# Patient Record
(demographics unavailable — no encounter records)

---

## 2024-11-12 NOTE — REVIEW OF SYSTEMS
[SOB on Exertion] : sob on exertion [Seasonal Allergies] : seasonal allergies [Back Pain] : back pain [History of Iron Deficiency] : history of iron deficiency [Negative] : Endocrine [TextBox_44] : +PPM

## 2024-11-12 NOTE — REASON FOR VISIT
[Follow-Up] : a follow-up visit [Abnormal CXR/ Chest CT] : an abnormal CXR/ chest CT [Asthma] : asthma [Sleep Apnea] : sleep apnea [Shortness of Breath] : shortness of breath [Pulmonary Nodules] : pulmonary nodules [TextBox_44] : weight issues

## 2024-11-12 NOTE — HISTORY OF PRESENT ILLNESS
[Intermittent] : intermittent [Inhaled Short-Acting Beta-2 Agonists] : inhaled short-acting beta-2 agonists [Inhaled Long-Acting Beta-2 Agonists] : inhaled long-acting beta-2 agonists [Inhaled Corticosteroids] : inhaled corticosteroids [CPAP] : CPAP [Good Compliance] : good compliance with treatment [Good Tolerance] : good tolerance of treatment [Good Symptom Control] : good symptom control [Follow-Up - Routine Clinic] : a routine clinic follow-up of [Excess Weight] : excess weight [Low Calorie Diet] : low calorie diet [Exercise Regimen] : exercise regimen [Fair Compliance] : fair compliance with treatment [Fair Tolerance] : fair tolerance of treatment [Fair Symptom Control] : fair symptom control [Sleep Apnea] : sleep apnea [Hypertension] : hypertension [High] : high [Well Balanced Diet] : well balanced meals [___ Times/Week] : exercises [unfilled] times per week [Aerobic Conditioning] : aerobic conditioning [On ___] : performed on [unfilled] [Patient] : the patient [Indication ___] : for an indication of [unfilled] [None] : no new symptoms reported [TextBox_4] : Patient c/o SOBOE but is otherwise without associated respiratory complaints. Pt is an ex-smoker of up to 2 ppd x 5 years but then smoked cigars and pipes. Now he has been off of everything around 2001 Pt reports that he acts out his dreams; concern for REM behavior disorder. He was on Melatonin but now on Klonopin 0.5 mg with imrpovement. [FreeTextEntry1] : \par   [de-identified] : at 13 cm of water [FreeTextEntry9] : Chest CT [FreeTextEntry8] : mild dependent atelectasis  [TextEntry] : Chest CT from 8/1/14 reveals resolution of the previously described 4 mm nodules with small areas of atelectasis.  Chest CT from 10/6/15 revealed a stable 3 mm nodule going back to 10/2013; stable for 2 years.

## 2024-11-12 NOTE — DISCUSSION/SUMMARY
[FreeTextEntry1] : #1. The patient's SOB has essentially resolved with mild residual SOBOE. He has been doing well with Wixela 250 alone for his likely asthma given his obstructive pattern on PFTs and significant bronchodilator response in the past. I added Spiriva previously, but he did not tolerate this medication therefore he discontinued it. His last spirometry was improved on Wixela 250 alone with Albuterol as needed. #2. Pt was seen by psychiatry for nocturnal thrashing and was concerned that Wixela may be contributing so changed to Trelegy 200 but is back on Wixela 250; reviewed inhaler technique and stressed importance of rinsing mouth and throat after inhaler use. #3. Continue CPAP therapy at 13 cm of water for moderate CHASE. He has been in SR since he started CPAP. #4. Pt with possible REM BD based on symptoms though PSG without increased muscle tone or PLMs to explain thrashing at night though does not occur every night; pt may benefit from Melatonin 6-15 mg nightly which he can start at 3 mg initially and titrate as needed. He was on 9 mg and then 12 mg nightly but changed to 3 mg of Melatonin and Klonopin therapy at 0.5 mg nightly and is much better. #4. Replace equipment as needed; ordered 5/28/24 with [facility] per pt's request as he was making noises with his nasal mask. #5. Cardiology f/u to optimize his cardiac function.  #6. F/u chest CT in 10/2015 revealed a stable 3 mm nodule going back to 2013 and no further w/u is required at this time. #7. Diet and exercise for weight loss. #8. F/u in 6 months with compliance and layla. #9. S/p both Covid vaccines and booster; Rec annual flu vaccine.  The patient expressed understanding and agreement with the above recommendations/plan and accepts responsibility to be compliant with recommended testing, therapies, and f/u visits. All relevant questions and concerns were addressed.

## 2024-11-12 NOTE — CONSULT LETTER
[Dear  ___] : Dear  [unfilled], [Consult Letter:] : I had the pleasure of evaluating your patient, [unfilled]. [Please see my note below.] : Please see my note below. [Consult Closing:] : Thank you very much for allowing me to participate in the care of this patient.  If you have any questions, please do not hesitate to contact me. [Sincerely,] : Sincerely, [FreeTextEntry3] : Leobardo Crocker MD, FCCP, D. ABSM\par  Pulmonary and Sleep Medicine\par  [facility] [place]

## 2024-11-12 NOTE — PHYSICAL EXAM
[No Acute Distress] : no acute distress [Low Lying Soft Palate] : low lying soft palate [Enlarged Base of the Tongue] : enlarged base of the tongue [III] : Mallampati Class: III [Normal Appearance] : normal appearance [Supple] : supple [Normal Rate/Rhythm] : normal rate/rhythm [Normal S1, S2] : normal s1, s2 [No Murmurs] : no murmurs [No Resp Distress] : no resp distress [No Acc Muscle Use] : no acc muscle use [Normal Rhythm and Effort] : normal rhythm and effort [Clear to Auscultation Bilaterally] : clear to auscultation bilaterally [No Abnormalities] : no abnormalities [Benign] : benign [Not Tender] : not tender [Soft] : soft [No Clubbing] : no clubbing [1+ Pitting] : 1+ pitting [No Focal Deficits] : no focal deficits [Oriented x3] : oriented x3 [TextBox_11] : Moderate oropharyngeal crowding.

## 2024-11-12 NOTE — END OF VISIT
[Time Spent: ___ minutes] : I have spent [unfilled] minutes of time on the encounter which excludes teaching and separately reported services. [TextEntry] : Discussed with pt at length regarding asthma, CHASE, obesity, pulm nodule, possible REM BD; reviewed prior w/u with pt as above

## 2024-11-12 NOTE — RESULTS/DATA
[TextEntry] : PSG from 4/5/13 reveals severe CHASE with an AHI of 32. CPAP titration study from 4/10/13 revealed an optimum pressure of 12 cm of water.  S/N PSG from 7/29/15 revealed moderate CHASE with an AHI of 20 and a therapeutic pressure of 13 cm of water. PSG on PAP therapy revealed mild CHASE with an AHI of 6.8 without significant increase in muscle tone or significant PLMs. Compliance is % and a > 4 hr compliance of 77-97% with a residual AHI of 0.1-1.5 which is normal on 13 cm of water.

## 2024-11-12 NOTE — PROCEDURE
[FreeTextEntry1] : PFTs 6/19/17 - mild obstructive pattern without a significant BD response but with normal lung volumes and DLCO. His lung function was essentially at baseline. PFTs 6/7/18 - Mildly reduced FVC and FEV1 with an obstructive pattern but no significant BD response. Lung volumes and diffusion capacity were normal Spirometry 6/15/18 and 10/8/18 - Mild obstruction and back to baseline. PFTs 8/6/19 - Near normal with a minimally reduced FEV1 but otherwise normal PFTs and persistent obstruction. PFTs 4/26/21 - Normal FVC and mildly reduced FEV1 with obstruction with normal lung volumes and DLCO; overall slightly reduced c/w previous. PFTs 4/19/22 - Normal FVC and mildly reduced FEV1 with obstruction pattern with normal lung volumes and DLCO; essentially unchanged from previous. PFTs 8/22/23 - Normal FVC and mildly reduced FEV1 with obstruction with normal lung volumes and DLCO; essentially unchanged from previous. PFTs from 5/28/24 with normal layla but with obstruction and near normal lung volumes and DLCO. Near baseline.

## 2024-11-12 NOTE — HISTORY REVIEWED
[History reviewed] : History reviewed. [Medications and Allergies reviewed] : Medications and allergies reviewed. Drysol Counseling:  I discussed with the patient the risks of drysol/aluminum chloride including but not limited to skin rash, itching, irritation, burning.

## 2024-11-13 NOTE — PHYSICAL EXAM
[JVD] : no jugular venous distention  [Carotid Bruits] : no carotid bruits [Abdominal Masses] : No abdominal masses [Abdomen Tenderness] : ~T ~M No abdominal tenderness [Purpura] : no purpura  [Petechiae] : no petechiae [Skin Ulcer] : no ulcer [Skin Induration] : no induration [Alert] : alert [Oriented to Person] : oriented to person [Oriented to Place] : oriented to place [Oriented to Time] : oriented to time [Calm] : calm [de-identified] : non toxic, in no acute distress  [de-identified] : NC/AT  PERRL EOMI no scleral icterus  [de-identified] : trachea midline, no gross mass  [de-identified] : no audible wheezing or stridor  [de-identified] : soft, remains mildly obese, mild right lower abdominal tenderness, no guarding, no rebound, no masses  [de-identified] : FROM of all extremities with no gross deformity or angulation, there remains a non reducible ventral hernia about 3 cm above the umbilicus with a small non tender umbilical hernia  [de-identified] : mood is calm

## 2024-11-13 NOTE — ASSESSMENT
[FreeTextEntry1] : The patient is an 80-year-old male with stable ventral and umbilical hernias.  The patient now has onset of right lower abdominal pain. He had an area of chronic enteritis on prior CT scan imaging. He has been advised he will need another CT scan to rule out worsening of the enteritis in the right lower abdominal area. He was advised that the pain is not related to the hernias as these are stable, it is more likely related to the chronic enteritis seen on his prior imaging.  He has been advised that should the pain get worse, fevers, and vomit he should go to the ER for evaluation.  Further recommendations will follow review of the CT scan.  A total of 30 minutes was spent coordinating the patient's care.

## 2024-11-13 NOTE — HISTORY OF PRESENT ILLNESS
[de-identified] : The patient is with new onset right mid and right lower abdominal pain. He has no nausea or vomit, no fevers. He states that the pain started about 2 days ago and he feels it is the hernias getting worse.

## 2024-12-09 NOTE — HISTORY OF PRESENT ILLNESS
[de-identified] : The patient states that the right lower abdominal pain has improved and is only sensitive intermittently. He has no nausea or vomit and changes in his bowel function.

## 2024-12-09 NOTE — PHYSICAL EXAM
[JVD] : no jugular venous distention  [Carotid Bruits] : no carotid bruits [Abdominal Masses] : No abdominal masses [Abdomen Tenderness] : ~T ~M No abdominal tenderness [Purpura] : no purpura  [Petechiae] : no petechiae [Skin Ulcer] : no ulcer [Skin Induration] : no induration [Alert] : alert [Oriented to Person] : oriented to person [Oriented to Place] : oriented to place [Oriented to Time] : oriented to time [Calm] : calm [de-identified] : non toxic, in no acute distress  [de-identified] : NC/AT  PERRL EOMI no scleral icterus  [de-identified] : trachea midline, no gross mass  [de-identified] : no audible wheezing or stridor  [de-identified] : soft, remains mildly obese, no significant right lower abdominal tenderness, no guarding, no rebound, no masses  [de-identified] : FROM of all extremities with no gross deformity or angulation, there remains a non reducible ventral hernia about 3 cm above the umbilicus with a small non tender umbilical hernia  [de-identified] : mood is calm

## 2024-12-09 NOTE — ASSESSMENT
[FreeTextEntry1] : The patient is an 80-year-old male with intermittent right lower abdominal pain and thickening of the terminal ileum seen on 2 CT scans concerning for possible Crohn's.  The patient has been advised that he will benefit from GI follow up and will likely need a colonoscopy.  Regarding the hernias, we will continue with conservative management at this time. A total of 20 minutes was spent coordinating the patient's care.

## 2025-01-03 NOTE — PHYSICAL EXAM

## 2025-01-03 NOTE — HISTORY OF PRESENT ILLNESS
[FreeTextEntry1] : The patient has arrived for a consultation visit.  He has been evaluated in the past for pancreatic cystic lesion which has been stable.  Lately he has been complaining of a lot of abdominal cramping and discomfort and was noted to have terminal ileitis.  Then when I reviewed his scans it has been present for some time.  He had a colonoscopy with Dr. Maldonado which has shown some diverticulosis but his terminal ileum could not be intubated.  But this has been present on multiple scans.  He necessarily does not have any diarrhea or any rectal bleeding.  He is not on any medication for Crohn's disease.  He has a pacemaker.

## 2025-01-03 NOTE — ASSESSMENT
[FreeTextEntry1] : After reviewing all his imaging, I am recommending to obtain the labs including fecal calprotectin level.  We will consider a colonoscopy.  I will also consider doing a CT enterography to evaluate the length of the stricture.  If I cannot cannulate the terminal ileum, may need to refer him for surgical resection versus primary therapy.  Also due to his advanced age, consideration of biological therapy can be an issue.  Whether he has fibrostenotic or inflammatory disease, may have to be determined.  Had extensive counseling and discussion and management planning.  Sarbjit Johnson MD Gastroenterology

## 2025-01-27 NOTE — PHYSICAL EXAM

## 2025-01-27 NOTE — HISTORY OF PRESENT ILLNESS
[FreeTextEntry1] : Mr. Raines is an 80-year-old gentleman who was referred for colonoscopy evaluation of terminal ileitis, inability to access terminal ileum on prior colonoscopy.

## 2025-01-27 NOTE — ASSESSMENT
[FreeTextEntry1] : Mr. Raines is an 80-year-old gentleman who was referred for colonoscopy evaluation of terminal ileitis, inability to access terminal ileum on prior colonoscopy. Risks, benefits and alternatives discussed in detail. Patient medically optimized and agreeable to proceed with planned procedure.

## 2025-04-15 NOTE — PHYSICAL EXAM
[Alert] : alert [Normal Voice/Communication] : normal voice/communication [Healthy Appearing] : healthy appearing [No Acute Distress] : no acute distress [Sclera] : the sclera and conjunctiva were normal [Hearing Threshold Finger Rub Not [place]] : hearing was normal [Oropharynx] : the oropharynx was normal [Normal Appearance] : the appearance of the neck was normal [No Respiratory Distress] : no respiratory distress [No Acc Muscle Use] : no accessory muscle use [Respiration, Rhythm And Depth] : normal respiratory rhythm and effort [Heart Rate And Rhythm] : heart rate was normal and rhythm regular [Abdomen Tenderness] : non-tender [No Masses] : no abdominal mass palpated [Abdomen Soft] : soft [] : no hepatosplenomegaly [Oriented To Time, Place, And Person] : oriented to person, place, and time

## 2025-04-15 NOTE — ASSESSMENT
[FreeTextEntry1] : Mr. Raines is an 81-year-old gentleman who was referred for colonoscopy evaluation of terminal ileitis, inability to access terminal ileum on prior colonoscopy due to poor bowel preparation. Risks, benefits and alternatives discussed in detail. Patient medically optimized and agreeable to proceed with planned procedure.

## 2025-04-15 NOTE — HISTORY OF PRESENT ILLNESS
[FreeTextEntry1] : Mr. Raines is an 81-year-old gentleman who was referred for colonoscopy evaluation of terminal ileitis, inability to access terminal ileum on prior colonoscopy due to poor bowel preparation.

## 2025-05-12 NOTE — HISTORY OF PRESENT ILLNESS
[Intermittent] : intermittent [Inhaled Short-Acting Beta-2 Agonists] : inhaled short-acting beta-2 agonists [Inhaled Long-Acting Beta-2 Agonists] : inhaled long-acting beta-2 agonists [Inhaled Corticosteroids] : inhaled corticosteroids [CPAP] : CPAP [Good Compliance] : good compliance with treatment [Good Tolerance] : good tolerance of treatment [Good Symptom Control] : good symptom control [Follow-Up - Routine Clinic] : a routine clinic follow-up of [Excess Weight] : excess weight [Low Calorie Diet] : low calorie diet [Exercise Regimen] : exercise regimen [Fair Compliance] : fair compliance with treatment [Fair Tolerance] : fair tolerance of treatment [Fair Symptom Control] : fair symptom control [Sleep Apnea] : sleep apnea [Hypertension] : hypertension [High] : high [Well Balanced Diet] : well balanced meals [___ Times/Week] : exercises [unfilled] times per week [Aerobic Conditioning] : aerobic conditioning [On ___] : performed on [unfilled] [Patient] : the patient [Indication ___] : for an indication of [unfilled] [None] : no new symptoms reported [TextBox_4] : Patient c/o SOBOE but is otherwise without associated respiratory complaints. Pt is an ex-smoker of up to 2 ppd x 5 years but then smoked cigars and pipes. Now he has been off of everything around 2001 Pt reports that he acts out his dreams; concern for REM behavior disorder. He was on Melatonin but now on Klonopin 0.5 mg with imrpovement. [FreeTextEntry1] : \par   [de-identified] : at 13 cm of water [FreeTextEntry9] : Chest CT [FreeTextEntry8] : mild dependent atelectasis  [TextEntry] : Chest CT from 8/1/14 reveals resolution of the previously described 4 mm nodules with small areas of atelectasis.  Chest CT from 10/6/15 revealed a stable 3 mm nodule going back to 10/2013; stable for 2 years.

## 2025-05-12 NOTE — PROCEDURE
[FreeTextEntry1] : PFTs 6/19/17 - mild obstructive pattern without a significant BD response but with normal lung volumes and DLCO. His lung function was essentially at baseline. PFTs 6/7/18 - Mildly reduced FVC and FEV1 with an obstructive pattern but no significant BD response. Lung volumes and diffusion capacity were normal Spirometry 6/15/18 and 10/8/18 - Mild obstruction and back to baseline. PFTs 8/6/19 - Near normal with a minimally reduced FEV1 but otherwise normal PFTs and persistent obstruction. PFTs 4/26/21 - Normal FVC and mildly reduced FEV1 with obstruction with normal lung volumes and DLCO; overall slightly reduced c/w previous. PFTs 4/19/22 - Normal FVC and mildly reduced FEV1 with obstruction pattern with normal lung volumes and DLCO; essentially unchanged from previous. PFTs 8/22/23 - Normal FVC and mildly reduced FEV1 with obstruction with normal lung volumes and DLCO; essentially unchanged from previous. PFTs from 5/28/24 with normal layla but with obstruction and near normal lung volumes and DLCO. Near baseline. Layla 5/12/25 - Normal FVC and mildly reduced FEV1 with obstruction; slightly reduced from prior.

## 2025-05-12 NOTE — CONSULT LETTER
[Dear  ___] : Dear  [unfilled], [Consult Letter:] : I had the pleasure of evaluating your patient, [unfilled]. [Please see my note below.] : Please see my note below. [Consult Closing:] : Thank you very much for allowing me to participate in the care of this patient.  If you have any questions, please do not hesitate to contact me. [Sincerely,] : Sincerely, [FreeTextEntry3] : Leobardo Crocker MD, FCCP, D. ABSM\par  Pulmonary and Sleep Medicine\par  [facility] at [place]

## 2025-05-12 NOTE — DISCUSSION/SUMMARY
[FreeTextEntry1] : #1. The patient's SOB has essentially resolved with mild residual SOBOE. He has been doing well with Wixela 250 alone for his likely asthma given his obstructive pattern on PFTs and significant bronchodilator response in the past. I added Spiriva previously, but he did not tolerate this medication therefore he discontinued it. His last spirometry was improved on Wixela 250 alone with Albuterol as needed. #2. Pt was seen by psychiatry for nocturnal thrashing and was concerned that Wixela may be contributing so changed to Trelegy 200 but is back on Wixela 250; reviewed inhaler technique and stressed importance of rinsing mouth and throat after inhaler use. #3. Continue CPAP therapy at 13 cm of water for moderate CHASE. He has been in SR since he started CPAP. #4. Pt with possible REM BD based on symptoms though PSG without increased muscle tone or PLMs to explain thrashing at night though does not occur every night; pt may benefit from Melatonin 6-15 mg nightly which he can start at 3 mg initially and titrate as needed. He was on 9 mg and then 12 mg nightly but now changed to 3 mg of Melatonin and Klonopin therapy at 0.5 mg nightly and is much better. #4. Replace equipment as needed; ordered 5/12/25 with [facility] per pt's request as he was making noises with his nasal mask. #5. Cardiology f/u to optimize his cardiac function.  #6. F/u chest CT in 10/2015 revealed a stable 3 mm nodule going back to 2013 and no further w/u is required at this time. #7. Diet and exercise for weight loss. #8. F/u in 4 months with compliance and layla given slight reduction in layla currently. #9. S/p both Covid vaccines and booster; Rec annual flu vaccine.  The patient expressed understanding and agreement with the above recommendations/plan and accepts responsibility to be compliant with recommended testing, therapies, and f/u visits. All relevant questions and concerns were addressed.

## 2025-05-22 NOTE — HISTORY OF PRESENT ILLNESS
[de-identified] : Mr. 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 Referred by Dr. Sarbjit Johnson (GI).   Initially evaluated by Dr. Geronimo Maldonado (GI) in June 2020 for episodic rectal bleeding x 2 months and an episode of epigastric pain accompanied by vomiting 7-8x. Patient had been consuming about 6 alcoholic drinks per night. Then referred to Dr. Johnson for an EGD/EUS - completed on 9/3/2020. He was noted to have pancreatic neck cystic lesion (16 x 10 mm), and a pancreatic body lesion (approx 24 mm x15 mm). Fluid CEA level was high amylase level was low. Suggestive of mucinous cystic lesion. Some atypical findings on the pancreatic body cystic lesion. It was statistically high-risk on the molecular analysis-[employer] [facility].   Subsequently referred to surgical oncology and has been undergoing annual surveillance via CT A/P and CEA/Ca 19-9. Surveillance imaging from 05/14/2025 was stable and also demonstrated some short segment wall thickening involving the distal ileum, concerning for Chron's or chronic enteritis.   Mr. Raines returns today for follow up. Denies jaundice, acute pancreatitis, itching, chills, vomiting, steatorrhea, or other pancreatic sx. He does complain of recent persistent bloating, passing gas, and general abdominal discomfort. Recently followed up with GI, Dr. Gibbs, in April for a colonoscopy. Reportedly found with diverticulosis and possible Chron's. No follow up scheduled with GI yet.   PMH: HTN, HLD, A-fib (on chronic anticoagulation), HLD; HTN; obesity; mild COPD, asthma, Prostate CA 2017, hemorrhoids, hernia; CHASE; diverticulosis PSH: Permanent pacemaker, 3 episodes of cardioversion, inguinal hernia repair, abdominal hernia repair, tonsillectomy Social hx: Social etoh use; former smoker; ; retired Family Hx: Mother - DM2 Allergies: PCN

## 2025-05-22 NOTE — RESULTS/DATA
[FreeTextEntry1] : *** 05/14/2025 - CT ABDOMEN AND PELVIS IC ***   INTERPRETATION:  CLINICAL INFORMATION: 81-year-old male with pancreatic cysts for continued surveillance.  COMPARISON: Abdomen and pelvis CT scan dated 12/20/2024, 11/21/2024.  CONTRAST/COMPLICATIONS: IV Contrast: Isovue 370  90 cc administered   10 cc discarded Oral Contrast: NONE .  PROCEDURE: CT of the Abdomen and Pelvis was performed. Arterial imaging of the abdomen and Portal Venous phase imaging of the abdomen and pelvis were acquired. Sagittal and coronal reformats were performed.  FINDINGS: LOWER CHEST: Atrial and ventricular pacemaker leads, partially imaged.  LIVER: Within normal limits. BILE DUCTS: Normal caliber. GALLBLADDER: Within normal limits. SPLEEN: Within normal limits. PANCREAS: As on the prior study, there are cystic lesions seen within the pancreatic body, neck, and uncinate process. A dominant pancreatic body cystic lesion measures approximately 1.7 x 1.1 cm (8:39), unchanged. Two adjacent pancreatic body lesions, the larger of which measures 1.3 cm and a cystic lesion in the uncinate process measuring approximately 1.0 cm are also unchanged (8:43). There is no dilatation of the main pancreatic duct. ADRENALS: Within normal limits. KIDNEYS/URETERS: The kidneys enhance symmetrically without evidence for hydronephrosis. There are bilateral renal cysts including parapelvic cysts, a small hyperdense cyst in the anterior left upper pole, and subcentimeter hypodense renal lesions that are too small to characterize.  BLADDER: The urinary bladder is underdistended which limits evaluation. REPRODUCTIVE ORGANS: The prostate gland is within normal limits and demonstrates coarse calcifications.  BOWEL: No bowel obstruction. Scattered colonic diverticulosis is seen without evidence for diverticulitis. The appendix is not visualized.  There is circumferential short segment wall thickening involving the distal ileum (8:72 through 108) without substantial luminal dilatation. There are stranding changes again noted within the surrounding small bowel mesentery along with small lymph nodes which are without significant interval change.  PERITONEUM/RETROPERITONEUM: Within normal limits. VESSELS: The abdominal aorta is normal in caliber and demonstrates vascular calcifications. The celiac trunk, superior mesenteric artery, bilateral renal arteries, and inferior mesenteric artery are patent and normal in caliber. LYMPH NODES: No lymphadenopathy. ABDOMINAL WALL: Within normal limits. BONES: Status post right ORIF. Degenerative changes.  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 No dilatation of the main pancreatic duct.  Circumferential short segment wall thickening involving the distal ileum again appreciated with surrounding mesenteric stranding and small lymph nodes and without substantial luminal dilatation. The finding is not significantly changed compared with prior studies dating back to at least November 2024. This is thought most likely to represent chronic enteritis, possibly Crohn's disease, however, clinical correlation is recommended. Additional evaluation could be obtained with CT or MR enterography (if no contraindication to MRI).    --- End of Report ---

## 2025-05-22 NOTE — REASON FOR VISIT
[Follow-Up Visit] : a follow-up visit for [Referred By: ___] : Referred By: [unfilled] [FreeTextEntry2] : mucinous pancreatic cystic lesion

## 2025-05-22 NOTE — CONSULT LETTER
[Dear  ___] : Dear  [unfilled], [Courtesy Letter:] : I had the pleasure of seeing your patient, [unfilled], in my office today. [Please see my note below.] : Please see my note below. [Consult Closing:] : Thank you very much for allowing me to participate in the care of this patient.  If you have any questions, please do not hesitate to contact me. [Sincerely,] : Sincerely, [DrMedhat  ___] : Dr. ROY [DrMedhat ___] : Dr. ROY [FreeTextEntry3] : Roberto La MD, MPH, FACS, FSSO\par  , [facility] Surgical Oncology [facility]\par  [facility] [place]\par  Associate Professor of Surgery\par  [employer] at [facility]

## 2025-05-22 NOTE — PHYSICAL EXAM
[Normal] : not distended, non tender, no masses, no hepatosplenomegaly [de-identified] : wears glasses [de-identified] : RRR [de-identified] : easy WOB

## 2025-05-22 NOTE — ASSESSMENT
[FreeTextEntry1] : Mr. 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 Referred by Dr. Sarbjit Johnson (GI). Transitioning care from Dr. La, who is no longer with the hospital system.   Clinically, the patient's functional status is unremarkable/stable. Reviewed the imaging from 05/14/2025, CT A/P, which was overall stable. Noted on imaging was circumferential short segment wall thickening involving the distal ileum, stable compared to prior imaging, but concerning for chronic enteritis or possibly Crohn's disease. Advised he follow up with Dr. Gibbs regarding his symptoms and imaging findings, for further work up of Crohn's. I have messaged Dr. Gibbs's team in support of the patient's care.   Plan at this time is to continue annual surveillance imaging of the pancreatic cyst, given that the risk for developing a pancreatic CA is elevated in patients with pancreatic cysts in comparison to the general population. An abdominal/pelvic CT script was provided to the patient for May 2026. Per the patient's request, I will also forward a copy of the patient's note from today's encounter to his PCP, Dr. Gely Luna. Should the patient develop DM, jaundice, or acute pancreatitis, I have advised the patient to contact the office. All questions and concerns were addressed. Patient vocalized understanding and agreement to assessment and treatment plan.   PLAN: 1) Annual CT A/P in May '26 2) Follow up in 1 year, sooner if needed 3) Send CT report to PCP, Dr. Gely Luna 4) Follow up with DEMETRIUS Diaz MD   Assistant Professor of Surgery [place] [place] [employer] [facility] [employer] at [facility] Division of Surgical Oncology [facility] Phone: [phone] Fax: [phone]  Today, I personally spent 35 minutes in total time including reviewing imaging and studies, discussing complex treatment regimens, direct face to face time with the patient, patient education, answering patient questions and counseling, excluding separately billable procedures and billing time.   This note was written by Paula Ortiz on 05/22/2025, acting solely as a scribe for Dr. Miller Diaz MD. I have documented the information dictated during the patient encounter for the following sections: RFV, HPI, ROS, PE, ASSESSMENT/PLAN.  I personally performed the services described in the documentation, reviewed the documentation recorded by the scribe in my presence, and it accurately and completely records my words and actions.

## 2025-06-24 NOTE — PHYSICAL EXAM
[Alert] : alert [Normal Voice/Communication] : normal voice/communication [Healthy Appearing] : healthy appearing [No Acute Distress] : no acute distress [Sclera] : the sclera and conjunctiva were normal [Hearing Threshold Finger Rub Not [place]] : hearing was normal [Normal Lips/Gums] : the lips and gums were normal [Normal Appearance] : the appearance of the neck was normal [No Respiratory Distress] : no respiratory distress [No Acc Muscle Use] : no accessory muscle use [Abdomen Tenderness] : non-tender [No Masses] : no abdominal mass palpated [Abdomen Soft] : soft [] : no hepatosplenomegaly [Oriented To Time, Place, And Person] : oriented to person, place, and time

## 2025-06-24 NOTE — HISTORY OF PRESENT ILLNESS
[FreeTextEntry1] : January 2020 with Dr. Gibbs showed hemorrhoids, reticulosis, erythema in the ascending/cecum with superficial bleeding with contact needing 1 hemostatic clip placed..  Unable to intubate TI due to anatomical and visualization limitations.   April 2025 showed abnormal TI, loss of villi, possibly scarring, 3 mm adenomatous polyp in the ascending colon, hemorrhoids and diverticulosis.  Pathology of the TI biopsy was negative for colitis.

## 2025-06-24 NOTE — ASSESSMENT
[FreeTextEntry1] : Patient is a 81 year old male, with PMH of mucinous pancreatic lesion, HTN, HLD, A-fib on Eliquis, obesity, mild COPD, asthma, prostate cancer in 2017, CHASE, diverticulosis, PPM, who presents for follow-up visit.  Terminal ileitis on CT a/p since November 2024, pt with predominantly constipation taking Miralax daily TI abnormal on colonoscopy but negative on pathology  Fecal calprotectin high at 515 CRP normal Will order CT enterography (pt cannot do MRI due to PPM) Will repeat labs to include CBC, CMP, CRP,  repeat calprotectin and IBD panel Pending results, would consider starting on oral Mesalamine vs Sulfasalazine vs biologics   Dr Johnson present for today's examination   RTC in 3 weeks, sooner if needed

## 2025-07-22 NOTE — ASSESSMENT
[FreeTextEntry1] : Patient is a 81 year old male, with PMH of mucinous pancreatic lesion, HTN, HLD, A-fib on Eliquis, obesity, mild COPD, asthma, prostate cancer in 2017, CHASE, diverticulosis, PPM, who presents for follow-up visit.  Labs and imaging consistent with Crohn's disease, terminal ileitis Will start with Asacol 800 mg 3 times daily Update fecal calprotectin in about 2 months and return to clinic shortly after to review If fecal calprotectin is improving, will keep on oral treatment If fecal calprotectin still elevated and not improving, will need to consider Biologics  RTC ~3 months

## 2025-07-22 NOTE — HISTORY OF PRESENT ILLNESS
[FreeTextEntry1] : January 2025 with Dr. Gibbs showed hemorrhoids, reticulosis, erythema in the ascending/cecum with superficial bleeding with contact needing 1 hemostatic clip placed..  Unable to intubate TI due to anatomical and visualization limitations.   April 2025 showed abnormal TI, loss of villi, possibly scarring, 3 mm adenomatous polyp in the ascending colon, hemorrhoids and diverticulosis.  Pathology of the TI biopsy was negative for colitis.

## 2025-07-22 NOTE — PHYSICAL EXAM
[Alert] : alert [Normal Voice/Communication] : normal voice/communication [Healthy Appearing] : healthy appearing [No Acute Distress] : no acute distress [Sclera] : the sclera and conjunctiva were normal [Hearing Threshold Finger Rub Not [place]] : hearing was normal [Normal Lips/Gums] : the lips and gums were normal [Normal Appearance] : the appearance of the neck was normal [No Respiratory Distress] : no respiratory distress [No Acc Muscle Use] : no accessory muscle use [Oriented To Time, Place, And Person] : oriented to person, place, and time